# Patient Record
Sex: MALE | Race: OTHER | Employment: PART TIME | URBAN - METROPOLITAN AREA
[De-identification: names, ages, dates, MRNs, and addresses within clinical notes are randomized per-mention and may not be internally consistent; named-entity substitution may affect disease eponyms.]

---

## 2023-09-11 ENCOUNTER — HOSPITAL ENCOUNTER (EMERGENCY)
Facility: HOSPITAL | Age: 23
Discharge: LEFT BEFORE DISCHARGE | End: 2023-09-11
Attending: EMERGENCY MEDICINE

## 2023-09-11 VITALS
OXYGEN SATURATION: 99 % | TEMPERATURE: 98.4 F | WEIGHT: 158.29 LBS | SYSTOLIC BLOOD PRESSURE: 129 MMHG | RESPIRATION RATE: 18 BRPM | HEART RATE: 95 BPM | DIASTOLIC BLOOD PRESSURE: 83 MMHG

## 2023-09-11 ASSESSMENT — PAIN SCALES - GENERAL: PAINLEVEL_OUTOF10: 0

## 2023-09-11 NOTE — ED TRIAGE NOTES
Pt did take a medication from his mom, that is hers for anxiety about 430am- it calmed it down a little.

## 2023-09-11 NOTE — ED NOTES
Patient is not in the room at this time. Attempted to call patients phone number that is on file to verify if he had left before being seen per charge nurse. Phone number on file was patients mother who was unaware that he came to the hospital.     Charge nurse made aware.   Moved patient YEMI at this time     Aubrie Avalos  09/11/23 1521